# Patient Record
Sex: FEMALE | Race: WHITE | NOT HISPANIC OR LATINO
[De-identification: names, ages, dates, MRNs, and addresses within clinical notes are randomized per-mention and may not be internally consistent; named-entity substitution may affect disease eponyms.]

---

## 2018-02-20 PROBLEM — Z00.00 ENCOUNTER FOR PREVENTIVE HEALTH EXAMINATION: Status: ACTIVE | Noted: 2018-02-20

## 2018-03-22 ENCOUNTER — APPOINTMENT (OUTPATIENT)
Dept: ENDOCRINOLOGY | Facility: CLINIC | Age: 68
End: 2018-03-22
Payer: MEDICARE

## 2018-03-22 VITALS
SYSTOLIC BLOOD PRESSURE: 103 MMHG | DIASTOLIC BLOOD PRESSURE: 71 MMHG | WEIGHT: 159 LBS | HEART RATE: 94 BPM | BODY MASS INDEX: 25.55 KG/M2 | HEIGHT: 66 IN

## 2018-03-22 DIAGNOSIS — Z85.3 PERSONAL HISTORY OF MALIGNANT NEOPLASM OF BREAST: ICD-10-CM

## 2018-03-22 DIAGNOSIS — Z82.49 FAMILY HISTORY OF ISCHEMIC HEART DISEASE AND OTHER DISEASES OF THE CIRCULATORY SYSTEM: ICD-10-CM

## 2018-03-22 PROCEDURE — 83036 HEMOGLOBIN GLYCOSYLATED A1C: CPT | Mod: QW

## 2018-03-22 PROCEDURE — 82962 GLUCOSE BLOOD TEST: CPT

## 2018-03-22 PROCEDURE — 99205 OFFICE O/P NEW HI 60 MIN: CPT | Mod: 25

## 2018-03-23 LAB
T3 SERPL-MCNC: 115 NG/DL
T4 FREE SERPL-MCNC: 1.2 NG/DL
TSH SERPL-ACNC: 3.53 UIU/ML

## 2018-03-25 LAB
THYROGLOB AB SERPL-ACNC: <20 IU/ML
THYROPEROXIDASE AB SERPL IA-ACNC: <10 IU/ML

## 2018-04-12 ENCOUNTER — RX RENEWAL (OUTPATIENT)
Age: 68
End: 2018-04-12

## 2018-06-28 ENCOUNTER — APPOINTMENT (OUTPATIENT)
Dept: ENDOCRINOLOGY | Facility: CLINIC | Age: 68
End: 2018-06-28

## 2018-08-31 ENCOUNTER — APPOINTMENT (OUTPATIENT)
Dept: ENDOCRINOLOGY | Facility: CLINIC | Age: 68
End: 2018-08-31
Payer: MEDICARE

## 2018-08-31 VITALS
SYSTOLIC BLOOD PRESSURE: 140 MMHG | HEIGHT: 67 IN | WEIGHT: 165.4 LBS | BODY MASS INDEX: 25.96 KG/M2 | DIASTOLIC BLOOD PRESSURE: 89 MMHG | HEART RATE: 81 BPM

## 2018-08-31 LAB
GLUCOSE BLDC GLUCOMTR-MCNC: 149
HBA1C MFR BLD HPLC: 7.5

## 2018-08-31 PROCEDURE — 83036 HEMOGLOBIN GLYCOSYLATED A1C: CPT | Mod: QW

## 2018-08-31 PROCEDURE — 82962 GLUCOSE BLOOD TEST: CPT

## 2018-08-31 PROCEDURE — 99215 OFFICE O/P EST HI 40 MIN: CPT | Mod: 25

## 2018-09-04 ENCOUNTER — RX CHANGE (OUTPATIENT)
Age: 68
End: 2018-09-04

## 2018-09-04 RX ORDER — SITAGLIPTIN 100 MG/1
100 TABLET, FILM COATED ORAL DAILY
Qty: 90 | Refills: 3 | Status: DISCONTINUED | COMMUNITY
Start: 2018-08-31 | End: 2018-09-04

## 2018-09-05 ENCOUNTER — RX CHANGE (OUTPATIENT)
Age: 68
End: 2018-09-05

## 2018-09-05 RX ORDER — SAXAGLIPTIN 5 MG/1
5 TABLET, FILM COATED ORAL DAILY
Qty: 90 | Refills: 3 | Status: DISCONTINUED | COMMUNITY
Start: 2018-09-04 | End: 2018-09-05

## 2018-09-27 ENCOUNTER — APPOINTMENT (OUTPATIENT)
Dept: ENDOCRINOLOGY | Facility: CLINIC | Age: 68
End: 2018-09-27
Payer: MEDICARE

## 2018-09-27 ENCOUNTER — RESULT CHARGE (OUTPATIENT)
Age: 68
End: 2018-09-27

## 2018-09-27 VITALS
WEIGHT: 165 LBS | SYSTOLIC BLOOD PRESSURE: 141 MMHG | BODY MASS INDEX: 25.9 KG/M2 | HEIGHT: 67 IN | HEART RATE: 94 BPM | DIASTOLIC BLOOD PRESSURE: 85 MMHG

## 2018-09-27 LAB — GLUCOSE BLDC GLUCOMTR-MCNC: 144

## 2018-09-27 PROCEDURE — 99211 OFF/OP EST MAY X REQ PHY/QHP: CPT | Mod: 25

## 2018-09-27 PROCEDURE — 97802 MEDICAL NUTRITION INDIV IN: CPT

## 2018-09-27 PROCEDURE — 82962 GLUCOSE BLOOD TEST: CPT

## 2018-11-19 ENCOUNTER — LABORATORY RESULT (OUTPATIENT)
Age: 68
End: 2018-11-19

## 2018-11-19 ENCOUNTER — APPOINTMENT (OUTPATIENT)
Dept: ENDOCRINOLOGY | Facility: CLINIC | Age: 68
End: 2018-11-19
Payer: MEDICARE

## 2018-11-19 VITALS
DIASTOLIC BLOOD PRESSURE: 79 MMHG | HEIGHT: 67 IN | WEIGHT: 153 LBS | BODY MASS INDEX: 24.01 KG/M2 | SYSTOLIC BLOOD PRESSURE: 130 MMHG | HEART RATE: 92 BPM

## 2018-11-19 DIAGNOSIS — Z83.49 FAMILY HISTORY OF OTHER ENDOCRINE, NUTRITIONAL AND METABOLIC DISEASES: ICD-10-CM

## 2018-11-19 LAB — GLUCOSE BLDC GLUCOMTR-MCNC: 124

## 2018-11-19 PROCEDURE — 99214 OFFICE O/P EST MOD 30 MIN: CPT | Mod: 25

## 2018-11-19 PROCEDURE — 82962 GLUCOSE BLOOD TEST: CPT

## 2018-11-19 RX ORDER — BLOOD SUGAR DIAGNOSTIC
STRIP MISCELLANEOUS
Qty: 1 | Refills: 3 | Status: DISCONTINUED | COMMUNITY
Start: 2018-03-22 | End: 2018-11-19

## 2018-11-19 RX ORDER — LANCETS 33 GAUGE
EACH MISCELLANEOUS
Qty: 2 | Refills: 3 | Status: DISCONTINUED | COMMUNITY
Start: 2018-03-22 | End: 2018-11-19

## 2018-11-19 RX ORDER — ISOPROPYL ALCOHOL 0.7 ML/ML
SWAB TOPICAL
Qty: 1 | Refills: 3 | Status: DISCONTINUED | COMMUNITY
Start: 2018-03-22 | End: 2018-11-19

## 2018-11-20 ENCOUNTER — MOBILE ON CALL (OUTPATIENT)
Age: 68
End: 2018-11-20

## 2018-11-23 ENCOUNTER — MOBILE ON CALL (OUTPATIENT)
Age: 68
End: 2018-11-23

## 2018-12-05 ENCOUNTER — FORM ENCOUNTER (OUTPATIENT)
Age: 68
End: 2018-12-05

## 2018-12-06 ENCOUNTER — OUTPATIENT (OUTPATIENT)
Dept: OUTPATIENT SERVICES | Facility: HOSPITAL | Age: 68
LOS: 1 days | End: 2018-12-06
Payer: MEDICARE

## 2018-12-06 PROCEDURE — 78306 BONE IMAGING WHOLE BODY: CPT | Mod: 26

## 2018-12-06 PROCEDURE — A9503: CPT

## 2018-12-06 PROCEDURE — 78306 BONE IMAGING WHOLE BODY: CPT

## 2019-04-08 ENCOUNTER — RX RENEWAL (OUTPATIENT)
Age: 69
End: 2019-04-08

## 2019-05-02 ENCOUNTER — APPOINTMENT (OUTPATIENT)
Dept: ENDOCRINOLOGY | Facility: CLINIC | Age: 69
End: 2019-05-02

## 2019-06-14 ENCOUNTER — APPOINTMENT (OUTPATIENT)
Dept: ENDOCRINOLOGY | Facility: CLINIC | Age: 69
End: 2019-06-14
Payer: MEDICARE

## 2019-06-14 VITALS
SYSTOLIC BLOOD PRESSURE: 125 MMHG | WEIGHT: 158 LBS | HEART RATE: 79 BPM | DIASTOLIC BLOOD PRESSURE: 76 MMHG | BODY MASS INDEX: 24.8 KG/M2 | HEIGHT: 67 IN

## 2019-06-14 DIAGNOSIS — R79.89 OTHER SPECIFIED ABNORMAL FINDINGS OF BLOOD CHEMISTRY: ICD-10-CM

## 2019-06-14 LAB
GLUCOSE BLDC GLUCOMTR-MCNC: 125
HBA1C MFR BLD HPLC: 7

## 2019-06-14 PROCEDURE — 83036 HEMOGLOBIN GLYCOSYLATED A1C: CPT | Mod: QW

## 2019-06-14 PROCEDURE — 99214 OFFICE O/P EST MOD 30 MIN: CPT | Mod: 25

## 2019-06-14 PROCEDURE — 82962 GLUCOSE BLOOD TEST: CPT

## 2019-06-14 NOTE — ASSESSMENT
[FreeTextEntry1] : Type 2 diabetes mellitus. Point-of-care HbA1c 7.0% and blood glucose 125 mg/dL today; HbA1c 6.6% in January 2019 and 7.5% in August 2018. No known history of micro- or macrovascular complications. She is tolerating her current regimen well. We discussed focusing on lifestyle modification to get HbA1c to goal less than 7.0%.\par Patient to follow-up with nutrition\par Continue metformin ER 1500 mg daily\par Continue sitagliptin 100 mg daily\par She is on aspirin\par She is not on a blood pressure regimen; blood pressure under good control\par She is on statin for cholesterol; last lipid panel at goal \par Nephropathy screening: States will be performed with next laboratory testing\par Last ophthalmology appointment: June 2019\par Last dental appointment: June 2019\par She is up-to-date with influenza and pneumonia vaccines\par \par Elevated TSH. She was noted to have an elevated TSH to 7.580 uIU/mL in March 2018. Repeat thyroid function panel a few days later was normal with negative thyroid antibodies. She has subsequently been clinically and biochemically euthyroid. She has a family history of hypothyroidism.\par Monitor TSH annually or more often if symptoms develop\par \par Elevated alkaline phosphatase/bone health. Alkaline phosphatase was elevated on last blood tests with normal gamma glutamyl transferase. Nuclear medicine bone scan in December 2018 without evidence of Paget's disease or other abnormal uptake. She states a recent bone density test was normal and I advised she bring the result with her to her next appointment.\par \par Return to see me in 3 months.\par \par CC:\par Dr. Emely Mayer, Fax [phone]

## 2019-06-14 NOTE — HISTORY OF PRESENT ILLNESS
[FreeTextEntry1] : Ms. Spence is a 69 year-old woman with a history of type 2 diabetes mellitus, stage 0 breast breast status post lumpectomy on letrozole, elevated TSH more recently normal presenting for follow-up. I saw her for an initial visit in August 2018 and last in November; she is a former patient of Dr. Snyder. She is accompanied by her sister, Doris.\par \par Type 2 diabetes mellitus. Point-of-care HbA1c 7.0% and blood glucose 125 mg/dL today; HbA1c 6.6% in January 2019 and 7.5% in August 2018. No known history of micro- or macrovascular complications.\par She was diagnosed with diabetes around 2015.\par She was taking metformin ER 1500 mg daily at her initial visit with me. 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 \par She was noted to have an elevated TSH to 7.580 uIU/mL in March 2018.\par Repeat thyroid function panel a few days later was normal with negative thyroid antibodies, and she has remained biochemically euthyroid since that time.\par Her sister has a history of hypothyroidism.\par \par Interim History \par She has been having more bread and rolls. \par She had a history of elevated alkaline phosphatase. Nuclear medicine bone scan in December 2018 without evidence of Paget's disease or other abnormal uptake.\par She feels well today. Weight is 5 pounds higher than last visit. No chest pain, shortness of breath, polyuria/polydipsia, lower extremity numbness/tingling.\par Medical and surgical history, medications, allergies, social and family history reviewed and updated as needed.

## 2019-06-14 NOTE — PHYSICAL EXAM
[Alert] : alert [No Acute Distress] : no acute distress [Healthy Appearance] : healthy appearance [Normal Sclera/Conjunctiva] : normal sclera/conjunctiva [Supple] : the neck was supple [No Neck Mass] : no neck mass was observed [Normal Oropharynx] : the oropharynx was normal [No LAD] : no lymphadenopathy [Thyroid Not Enlarged] : the thyroid was not enlarged [No Thyroid Nodules] : there were no palpable thyroid nodules [Normal Rate and Effort] : normal respiratory rhythm and effort [Clear to Auscultation] : lungs were clear to auscultation bilaterally [Normal Rate] : heart rate was normal  [Normal S1, S2] : normal S1 and S2 [Regular Rhythm] : with a regular rhythm [No Edema] : there was no peripheral edema [No Stigmata of Cushings Syndrome] : no stigmata of cushings syndrome [Normal Insight/Judgement] : insight and judgment were intact [Normal Gait] : normal gait [Right Foot Was Examined] : right foot ~C was examined [Left Foot Was Examined] : left foot ~C was examined [Normal] : normal [2+] : 2+ in the dorsalis pedis [Kyphosis] : no kyphosis present [de-identified] : no moon facies, no supraclavicular fat pads [Acanthosis Nigricans] : no acanthosis nigricans [Diminished Throughout Both Feet] : normal tactile sensation with monofilament testing throughout both feet

## 2019-06-14 NOTE — DATA REVIEWED
[FreeTextEntry1] : Laboratories (January 24, 2019) reviewed and significant for:\par BUN/creatinine 18/0.69 mg/dL (eGFR 90 mL/min)\par Alkaline phosphatase 163 IU/L (normal: )\par Otherwise unremarkable comprehensive metabolic panel\par TSH 3.110 uIU/mL\par LDL 63 mg/dL\par HDL 41 mg/dL\par Total cholesterol 114 mg/dL\par Triglycerides 87 mg/dL

## 2019-07-08 ENCOUNTER — RX RENEWAL (OUTPATIENT)
Age: 69
End: 2019-07-08

## 2019-12-20 ENCOUNTER — APPOINTMENT (OUTPATIENT)
Dept: ENDOCRINOLOGY | Facility: CLINIC | Age: 69
End: 2019-12-20
Payer: MEDICARE

## 2019-12-20 VITALS
WEIGHT: 161 LBS | HEART RATE: 96 BPM | SYSTOLIC BLOOD PRESSURE: 114 MMHG | DIASTOLIC BLOOD PRESSURE: 72 MMHG | BODY MASS INDEX: 25.27 KG/M2 | HEIGHT: 67 IN

## 2019-12-20 LAB
GLUCOSE BLDC GLUCOMTR-MCNC: 115
HBA1C MFR BLD HPLC: 6.9

## 2019-12-20 PROCEDURE — 83036 HEMOGLOBIN GLYCOSYLATED A1C: CPT | Mod: QW

## 2019-12-20 PROCEDURE — 99214 OFFICE O/P EST MOD 30 MIN: CPT | Mod: 25

## 2019-12-20 PROCEDURE — 82962 GLUCOSE BLOOD TEST: CPT

## 2019-12-20 NOTE — HISTORY OF PRESENT ILLNESS
[FreeTextEntry1] : Ms. Spence is a 69 year-old woman with a history of type 2 diabetes mellitus, stage 0 breast breast status post lumpectomy on letrozole, elevated TSH more recently normal presenting for follow-up. I saw her for an initial visit in August 2018 and last in June 2019; she is a former patient of Dr. Snyder. She is accompanied by her sister, Doris.\par \par Type 2 diabetes mellitus. Point-of-care HbA1c 6.9% and blood glucose 115 mg/dL today; HbA1c 7.0% in June 2019, 6.6% in January 2019 and 7.5% in August 2018. No known history of micro- or macrovascular complications.\par She was diagnosed with diabetes around 2015.\par She was taking metformin ER 1500 mg daily at her initial visit with me. 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 \par She was noted to have an elevated TSH to 7.580 uIU/mL in March 2018.\par Repeat thyroid function panel a few days later was normal with negative thyroid antibodies, and she has remained biochemically euthyroid since that time.\par Her sister has a history of hypothyroidism.\par \par Interim History \par She has had memory issues and recently saw neurology.\par She feels well today. Weight is up 3 pounds since last visit. She made extensive lifestyle modifications since her diagnosis of diabetes and had lose around 30 pounds. No chest pain, shortness of breath, polyuria/polydipsia, lower extremity numbness/tingling.\par Medical and surgical history, medications, allergies, social and family history reviewed and updated as needed.

## 2019-12-20 NOTE — ASSESSMENT
[FreeTextEntry1] : Type 2 diabetes mellitus. Point-of-care HbA1c 6.9% and blood glucose 115 mg/dL today; HbA1c 7.0% in June 2019, 6.6% in January 2019 and 7.5% in August 2018. No known history of micro- or macrovascular complications. I congratulated her on her improvement in glycemic control. We discussed the importance of diet and exercise and lifestyle modification for glycemic control. She is tolerating her current regimen and we will continue. \par Encouraged follow-up with nutrition\par Continue metformin ER 1500 mg daily\par Continue sitagliptin 100 mg daily\par She is on aspirin\par She is not on a blood pressure regimen; blood pressure around goal\par She is on statin for cholesterol; last lipid panel around goal \par Nephropathy screening: States will be performed with next laboratory testing\par Last ophthalmology appointment: December 2019\par Last dental appointment: June 2019, upcoming appointment in January\par She is up-to-date with influenza and pneumonia vaccines\par \par Elevated TSH. She was noted to have an elevated TSH to 7.580 uIU/mL in March 2018. Repeat thyroid function panel a few days later was normal with negative thyroid antibodies. She has subsequently been clinically and biochemically euthyroid. She has a family history of hypothyroidism.\par Monitor TSH annually or more often if symptoms develop\par \par Elevated alkaline phosphatase/bone health. Alkaline phosphatase was elevated on last blood tests with normal gamma glutamyl transferase. Nuclear medicine bone scan in December 2018 without evidence of Paget's disease or other abnormal uptake. She states a recent bone density test was normal and I asked she bring the result with her to her next appointment.\par \par Return to see me in 3 months.\par \par CC:\par Dr. Emely Mayer, Fax [phone]

## 2019-12-20 NOTE — PHYSICAL EXAM
[Alert] : alert [No Acute Distress] : no acute distress [Healthy Appearance] : healthy appearance [Normal Sclera/Conjunctiva] : normal sclera/conjunctiva [Normal Oropharynx] : the oropharynx was normal [No Neck Mass] : no neck mass was observed [Supple] : the neck was supple [No LAD] : no lymphadenopathy [Thyroid Not Enlarged] : the thyroid was not enlarged [No Thyroid Nodules] : there were no palpable thyroid nodules [Normal Rate and Effort] : normal respiratory rhythm and effort [Clear to Auscultation] : lungs were clear to auscultation bilaterally [Normal Rate] : heart rate was normal  [No Edema] : there was no peripheral edema [Regular Rhythm] : with a regular rhythm [Normal S1, S2] : normal S1 and S2 [No Stigmata of Cushings Syndrome] : no stigmata of cushings syndrome [Right Foot Was Examined] : right foot ~C was examined [Normal Gait] : normal gait [Left Foot Was Examined] : left foot ~C was examined [Normal] : normal [Normal Insight/Judgement] : insight and judgment were intact [Acanthosis Nigricans] : no acanthosis nigricans [Kyphosis] : no kyphosis present [Diminished Throughout Both Feet] : normal tactile sensation with monofilament testing throughout both feet [de-identified] : no moon facies, no supraclavicular fat pads

## 2020-05-16 ENCOUNTER — RX RENEWAL (OUTPATIENT)
Age: 70
End: 2020-05-16

## 2020-06-09 ENCOUNTER — APPOINTMENT (OUTPATIENT)
Dept: ENDOCRINOLOGY | Facility: CLINIC | Age: 70
End: 2020-06-09
Payer: MEDICARE

## 2020-06-09 VITALS
HEART RATE: 93 BPM | HEIGHT: 67 IN | WEIGHT: 145 LBS | DIASTOLIC BLOOD PRESSURE: 79 MMHG | SYSTOLIC BLOOD PRESSURE: 120 MMHG | BODY MASS INDEX: 22.76 KG/M2

## 2020-06-09 LAB
GLUCOSE BLDC GLUCOMTR-MCNC: 113
HBA1C MFR BLD HPLC: 6.6

## 2020-06-09 PROCEDURE — 82962 GLUCOSE BLOOD TEST: CPT

## 2020-06-09 PROCEDURE — 99214 OFFICE O/P EST MOD 30 MIN: CPT | Mod: 25

## 2020-06-09 PROCEDURE — 83036 HEMOGLOBIN GLYCOSYLATED A1C: CPT | Mod: QW

## 2020-06-09 NOTE — ASSESSMENT
[FreeTextEntry1] : Type 2 diabetes mellitus. Point-of-care HbA1c 6.6% and blood glucose 113 mg/dL today; HbA1c 6.9% in December 2019, 7.0% in June 2019, 6.6% in January 2019 and 7.5% in August 2018. No known history of micro- or macrovascular complications. I congratulated her on her lifestyle modifications and excellent glycemic control. We discussed the importance of diet and exercise and lifestyle modification for glycemic control. She is tolerating her current regimen and we will continue. \par Continue metformin ER 1500 mg daily\par Continue sitagliptin 100 mg daily\par She is on aspirin\par She is not on a blood pressure regimen; blood pressure around goal\par She is on statin for cholesterol; last lipid panel around goal \par Nephropathy screening: States will be performed with next laboratory testing\par Last ophthalmology appointment: December 2019; upcoming appointment in June 2020\par Last dental appointment: January 2020\par She is up-to-date with influenza and pneumonia vaccines\par \par Elevated alkaline phosphatase/bone health. Alkaline phosphatase has been elevated with normal gamma glutamyl transferase. Nuclear medicine bone scan in December 2018 without evidence of Paget's disease or other abnormal uptake. She states her last bone density test was normal; we discussed regular testing due to letrozole use and concern for bone loss.\par \par Return to see me in 6 months.\par \par CC:\par Dr. Emely Mayer, Fax [phone]

## 2020-06-09 NOTE — HISTORY OF PRESENT ILLNESS
[FreeTextEntry1] : Ms. Spence is a 70 year-old woman with a history of type 2 diabetes mellitus, stage 0 breast breast status post lumpectomy on letrozole presenting for follow-up. I saw her for an initial visit in August 2018 and last in December 2019; she is a former patient of Dr. Snyder. She is accompanied by her sister, Doris.\par \par Type 2 diabetes mellitus. Point-of-care HbA1c 6.6% and blood glucose 113 mg/dL today; HbA1c 6.9% in December 2019, 7.0% in June 2019, 6.6% in January 2019 and 7.5% in August 2018. No known history of micro- or macrovascular complications.\par She was diagnosed with diabetes around 2015.\par She was taking metformin ER 1500 mg daily at her initial visit with me. 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 \par She was noted to have an elevated TSH to 7.580 uIU/mL in March 2018.\par Repeat thyroid function panel a few days later was normal with negative thyroid antibodies, and she has remained biochemically euthyroid since that time.\par Her sister has a history of hypothyroidism.\par \par Interim History \par Weight is down 16 pounds since last visit. She has been having less carbohydrates and more greens. She has been walking 4-5 miles per day. She has been following a daily list of lifestyle goals made by her primary care provider. \par She feels well today. No chest pain, shortness of breath, polyuria/polydipsia, lower extremity numbness/tingling.\par Medical and surgical history, medications, allergies, social and family history reviewed and updated as needed.

## 2020-06-09 NOTE — DATA REVIEWED
[FreeTextEntry1] : Laboratories (June 1, 2020) reviewed and significant for:\par BUN/creatinine 16/0.76 mg/dL (eGFR 80 mL/min)\par Alkaline phosphatase 173 IU/L (normal: )\par Otherwise unremarkable comprehensive metabolic panel\par Hemoglobin A1c 6.6%\par LDL 67 mg/dL\par HDL 44 mg/dL\par Total cholesterol 131 mg/dL\par Triglycerides 110 mg/dL

## 2020-06-09 NOTE — PHYSICAL EXAM
[Alert] : alert [Healthy Appearance] : healthy appearance [No Acute Distress] : no acute distress [Normal Sclera/Conjunctiva] : normal sclera/conjunctiva [No Stigmata of Cushings Syndrome] : no stigmata of Cushings Syndrome [Normal Gait] : normal gait [Normal Insight/Judgement] : insight and judgment were intact [Kyphosis] : no kyphosis present [Acanthosis Nigricans] : no acanthosis nigricans [de-identified] : no moon facies, no supraclavicular fat pads

## 2020-12-15 ENCOUNTER — APPOINTMENT (OUTPATIENT)
Dept: ENDOCRINOLOGY | Facility: CLINIC | Age: 70
End: 2020-12-15
Payer: MEDICARE

## 2020-12-15 VITALS
HEIGHT: 67 IN | WEIGHT: 145 LBS | HEART RATE: 90 BPM | BODY MASS INDEX: 22.76 KG/M2 | DIASTOLIC BLOOD PRESSURE: 80 MMHG | SYSTOLIC BLOOD PRESSURE: 146 MMHG

## 2020-12-15 LAB
GLUCOSE BLDC GLUCOMTR-MCNC: 123
HBA1C MFR BLD HPLC: 6.1

## 2020-12-15 PROCEDURE — 99072 ADDL SUPL MATRL&STAF TM PHE: CPT

## 2020-12-15 PROCEDURE — 82962 GLUCOSE BLOOD TEST: CPT

## 2020-12-15 PROCEDURE — 83036 HEMOGLOBIN GLYCOSYLATED A1C: CPT | Mod: QW

## 2020-12-15 PROCEDURE — 99214 OFFICE O/P EST MOD 30 MIN: CPT | Mod: 25

## 2020-12-15 RX ORDER — POTASSIUM BICARB/MAG COMBO 21 500-300 MG
POWDER EFFERVESCENT (GRAM) ORAL
Refills: 0 | Status: DISCONTINUED | COMMUNITY
End: 2020-12-15

## 2020-12-15 NOTE — HISTORY OF PRESENT ILLNESS
[FreeTextEntry1] : Ms. Spence is a 70 year-old woman with a history of type 2 diabetes mellitus, stage 0 breast breast status post lumpectomy on letrozole presenting for follow-up. I saw her for an initial visit in August 2018 and last in June 2020; she is a former patient of Dr. Adrian Snyder. She is accompanied by her sister, Doris.\par \par Type 2 diabetes mellitus. Point-of-care HbA1c 6.1% and blood glucose 123 mg/dL today; HbA1c 6.6% in June 2020, 6.9% in December 2019, 7.0% in June 2019, 6.6% in January 2019 and 7.5% in August 2018. No known history of micro- or macrovascular complications.\par She was diagnosed with diabetes around 2015.\par She was taking metformin ER 1500 mg daily at her initial visit with me. 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 She had an unremarkable endoscopy and colonoscopy per her report. \par She has been having less carbohydrates and more greens. She has been walking 4-6 miles per day. She has been following a daily list of lifestyle goals made by her primary care provider. \par She feels well today. Weight is stable since last visit; weight is overall down 20 pounds from a high of 165 pounds. No chest pain, shortness of breath, polyuria/polydipsia, lower extremity numbness/tingling.\par Medical and surgical history, medications, allergies, social and family history reviewed and updated as needed.

## 2020-12-15 NOTE — PHYSICAL EXAM
[Alert] : alert [Healthy Appearance] : healthy appearance [No Acute Distress] : no acute distress [Normal Sclera/Conjunctiva] : normal sclera/conjunctiva [No Stigmata of Cushings Syndrome] : no stigmata of Cushings Syndrome [Normal Gait] : normal gait [Normal Insight/Judgement] : insight and judgment were intact [Kyphosis] : no kyphosis present [Acanthosis Nigricans] : no acanthosis nigricans [de-identified] : repeat blood pressure 136/78 mmHg [de-identified] : no moon facies, no supraclavicular fat pads

## 2020-12-15 NOTE — DATA REVIEWED
[FreeTextEntry1] : Laboratories (October 28, 2020) reviewed and significant for:\par Unremarkable complete blood count\par BUN/creatinine 15/0.74 mg/dL (eGFR 83 mL/min)\par Alkaline phosphatase 183 IU/L (normal: )\par Otherwise unremarkable comprehensive metabolic panel\par Hemoglobin A1c 6.8%\par Ferritin 9 ng/mL (normal: )\par \par Laboratories (June 1, 2020) reviewed and significant for:\par BUN/creatinine 16/0.76 mg/dL (eGFR 80 mL/min)\par Alkaline phosphatase 173 IU/L (normal: )\par Otherwise unremarkable comprehensive metabolic panel\par Hemoglobin A1c 6.6%\par LDL 67 mg/dL\par HDL 44 mg/dL\par Total cholesterol 131 mg/dL\par Triglycerides 110 mg/dL

## 2020-12-15 NOTE — ASSESSMENT
[FreeTextEntry1] : Type 2 diabetes mellitus. Point-of-care HbA1c 6.1% and blood glucose 123 mg/dL today; HbA1c 6.6% in June 2020, 6.9% in December 2019, 7.0% in June 2019, 6.6% in January 2019 and 7.5% in August 2018. No known history of micro- or macrovascular complications. I congratulated her on her lifestyle modifications and excellent glycemic control. We discussed the importance of diet and exercise and lifestyle modification for glycemic control. She is tolerating her current regimen and we will continue. \par Continue metformin ER 1500 mg daily\par Continue sitagliptin 100 mg daily\par She is on aspirin\par She is not on a blood pressure regimen; blood pressure around goal\par She is on statin for cholesterol; last lipid panel around goal \par Nephropathy screening: Due; recommend with next laboratory testing\par Last ophthalmology appointment: June 2020\par Last podiatry appointment: December 2020\par Last dental appointment: December 2020\par She is up-to-date with influenza and pneumonia vaccines\par \par Elevated alkaline phosphatase/bone health. Alkaline phosphatase had been elevated with normal gamma glutamyl transferase. Nuclear medicine bone scan in December 2018 without evidence of Paget's disease or other abnormal uptake. She states her last bone density test was normal; we discussed regular testing due to letrozole use and concern for bone loss.\par \par Return to see me in 6 months.\par \par CC:\par Dr. Emely Mayer, Fax [phone]

## 2021-03-22 ENCOUNTER — OUTPATIENT (OUTPATIENT)
Dept: OUTPATIENT SERVICES | Facility: HOSPITAL | Age: 71
LOS: 1 days | End: 2021-03-22

## 2021-03-22 ENCOUNTER — APPOINTMENT (OUTPATIENT)
Dept: CT IMAGING | Facility: CLINIC | Age: 71
End: 2021-03-22
Payer: SELF-PAY

## 2021-03-22 PROCEDURE — 75571 CT HRT W/O DYE W/CA TEST: CPT | Mod: 26

## 2021-06-23 ENCOUNTER — APPOINTMENT (OUTPATIENT)
Dept: ENDOCRINOLOGY | Facility: CLINIC | Age: 71
End: 2021-06-23
Payer: MEDICARE

## 2021-06-23 VITALS
SYSTOLIC BLOOD PRESSURE: 126 MMHG | BODY MASS INDEX: 23.34 KG/M2 | HEART RATE: 84 BPM | DIASTOLIC BLOOD PRESSURE: 80 MMHG | WEIGHT: 149 LBS

## 2021-06-23 LAB
GLUCOSE BLDC GLUCOMTR-MCNC: 112
HBA1C MFR BLD HPLC: 6.6

## 2021-06-23 PROCEDURE — 82962 GLUCOSE BLOOD TEST: CPT

## 2021-06-23 PROCEDURE — 99072 ADDL SUPL MATRL&STAF TM PHE: CPT

## 2021-06-23 PROCEDURE — 83036 HEMOGLOBIN GLYCOSYLATED A1C: CPT | Mod: QW

## 2021-06-23 PROCEDURE — 99214 OFFICE O/P EST MOD 30 MIN: CPT | Mod: 25

## 2021-06-23 NOTE — HISTORY OF PRESENT ILLNESS
[FreeTextEntry1] : Ms. Spence is a 71 year-old woman with a history of type 2 diabetes mellitus, breast cancer in situ status post lumpectomy on letrozole presenting for follow-up. I saw her for an initial visit in August 2018 and last in December 2020; she is a former patient of Dr. Adrian Snyder. She is accompanied by her sister, Doris.\par \par Type 2 diabetes mellitus. Point-of-care HbA1c 6.6% and glucose 112 mg/dL today; HbA1c 6.1% in December 2020, 6.6% in June 2020, 6.9% in December 2019, 7.0% in June 2019, 6.6% in January 2019 and 7.5% in August 2018. No known history of micro- or macrovascular complications.\par She was diagnosed with diabetes around 2015.\par At her initial visit she was taking metformin ER 1500 mg daily. 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 \par Her dose of vitamin B12 supplementation was decreased.\par She has been having less carbohydrates and more greens. She has been walking 4-5 miles per day. She has been following a daily list of lifestyle goals made by her primary care provider. \par She feels well today. Weight is up 4 pounds since last visit; weight was overall down 20 pounds from a high of 165 pounds. No chest pain, shortness of breath, polyuria/polydipsia, lower extremity numbness/tingling.\par Medical and surgical history, medications, allergies, social and family history reviewed and updated as needed.

## 2021-06-23 NOTE — DATA REVIEWED
[FreeTextEntry1] : Laboratories (June 14, 2021) reviewed and significant for:\par Unremarkable complete blood count\par Alkaline phosphatase 162 IU/L (normal: )\par Otherwise unremarkable comprehensive metabolic panel\par HbA1c 6.6%\par LDL 71 mg/dL\par HDL 55 mg/dL\par Total cholesterol 145 mg/dL\par Triglycerides 97 mg/dL

## 2021-06-23 NOTE — ASSESSMENT
[FreeTextEntry1] : Type 2 diabetes mellitus. Point-of-care HbA1c 6.6% and glucose 112 mg/dL today. No known history of micro- or macrovascular complications. I congratulated her on her lifestyle modifications and excellent glycemic control. We discussed the importance of diet and exercise and lifestyle modification for glycemic control. She is tolerating her current regimen and we will continue. \par Continue metformin ER 1500 mg daily\par Continue sitagliptin 100 mg daily\par She is on aspirin\par She is not on a blood pressure regimen; blood pressure around goal\par She is on statin for cholesterol; last lipid panel around goal \par Nephropathy screening: Due; recommend with next laboratory testing\par Last ophthalmology appointment: April 2021; every six months\par Last podiatry appointment: Every 8-9 weeks; upcoming appointment\par Last dental appointment: Every 4 months; upcoming appointment\par She is up-to-date with influenza, pneumonia, and COVID-19 (Moderna) vaccines\par \par Elevated alkaline phosphatase/bone health. Alkaline phosphatase had been elevated with normal gamma glutamyl transferase. Nuclear medicine bone scan in December 2018 without evidence of Paget's disease or other abnormal uptake. She states her last bone density test was normal; we discussed regular testing due to letrozole use and concern for bone loss. The plan is to continue letrozole through May 2022.\par \par Return to see me in 6 months.\par \par CC:\par Dr. Emely Mayer, Fax [phone]

## 2021-06-23 NOTE — PHYSICAL EXAM
[Alert] : alert [Healthy Appearance] : healthy appearance [No Acute Distress] : no acute distress [Normal Sclera/Conjunctiva] : normal sclera/conjunctiva [No Stigmata of Cushings Syndrome] : no stigmata of Cushings Syndrome [Normal Gait] : normal gait [Normal Insight/Judgement] : insight and judgment were intact [Normal Hearing] : hearing was normal [No Respiratory Distress] : no respiratory distress [Kyphosis] : no kyphosis present [Acanthosis Nigricans] : no acanthosis nigricans [de-identified] : no moon facies, no supraclavicular fat pads

## 2021-12-20 ENCOUNTER — APPOINTMENT (OUTPATIENT)
Dept: ENDOCRINOLOGY | Facility: CLINIC | Age: 71
End: 2021-12-20
Payer: MEDICARE

## 2021-12-20 VITALS
WEIGHT: 155 LBS | BODY MASS INDEX: 24.33 KG/M2 | SYSTOLIC BLOOD PRESSURE: 129 MMHG | HEART RATE: 82 BPM | HEIGHT: 67 IN | DIASTOLIC BLOOD PRESSURE: 83 MMHG

## 2021-12-20 LAB
GLUCOSE BLDC GLUCOMTR-MCNC: 89
HBA1C MFR BLD HPLC: 6.6

## 2021-12-20 PROCEDURE — 82962 GLUCOSE BLOOD TEST: CPT

## 2021-12-20 PROCEDURE — 83036 HEMOGLOBIN GLYCOSYLATED A1C: CPT | Mod: QW

## 2021-12-20 PROCEDURE — 99214 OFFICE O/P EST MOD 30 MIN: CPT | Mod: 25

## 2021-12-20 RX ORDER — KETOROLAC TROMETHAMINE 5 MG/ML
0.5 SOLUTION OPHTHALMIC
Qty: 3 | Refills: 0 | Status: ACTIVE | COMMUNITY
Start: 2021-09-08

## 2022-04-14 ENCOUNTER — APPOINTMENT (OUTPATIENT)
Dept: ENDOCRINOLOGY | Facility: CLINIC | Age: 72
End: 2022-04-14
Payer: MEDICARE

## 2022-04-14 VITALS
BODY MASS INDEX: 24.01 KG/M2 | HEIGHT: 67 IN | SYSTOLIC BLOOD PRESSURE: 130 MMHG | DIASTOLIC BLOOD PRESSURE: 84 MMHG | WEIGHT: 153 LBS | HEART RATE: 84 BPM

## 2022-04-14 LAB
GLUCOSE BLDC GLUCOMTR-MCNC: 120
HBA1C MFR BLD HPLC: 7.2

## 2022-04-14 PROCEDURE — 83036 HEMOGLOBIN GLYCOSYLATED A1C: CPT | Mod: QW

## 2022-04-14 PROCEDURE — 82962 GLUCOSE BLOOD TEST: CPT

## 2022-04-14 PROCEDURE — 99214 OFFICE O/P EST MOD 30 MIN: CPT | Mod: 25

## 2022-04-14 NOTE — PHYSICAL EXAM
[Alert] : alert [Healthy Appearance] : healthy appearance [No Acute Distress] : no acute distress [Normal Sclera/Conjunctiva] : normal sclera/conjunctiva [Normal Hearing] : hearing was normal [No Respiratory Distress] : no respiratory distress [No Stigmata of Cushings Syndrome] : no stigmata of Cushings Syndrome [Normal Gait] : normal gait [Normal Insight/Judgement] : insight and judgment were intact [Kyphosis] : no kyphosis present [Acanthosis Nigricans] : no acanthosis nigricans [de-identified] : no moon facies, no supraclavicular fat pads

## 2022-04-14 NOTE — HISTORY OF PRESENT ILLNESS
[FreeTextEntry1] : Ms. Spence is a 72 year-old woman with a history of type 2 diabetes mellitus, breast cancer in situ status post lumpectomy on letrozole presenting for follow-up. I saw her for an initial visit in August 2018 and last in December 2021; she is a former patient of Dr. Ardian Snyder. She is accompanied by her sister, Doris.\par \par Type 2 diabetes mellitus. Point-of-care HbA1c 7.2% and glucose 120 mg/dL today; HbA1c 6.6% in December 2021, 6.6% in June 2021, 6.1% in December 2020, 6.6% in June 2020, 6.9% in December 2019, 7.0% in June 2019, 6.6% in January 2019 and 7.5% in August 2018. No known history of micro- or macrovascular complications.\par She was diagnosed with diabetes around 2015.\par At her initial visit she was taking metformin ER 1500 mg daily. We continued metformin ER 1500 mg daily and started Januvia 100 mg daily in September 2018. 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 Her sister and her wife will be moving to [place] also.\par She has been having more carbohydrates recently. She has started eating dinner earlier. She has been walking 4-5 miles per day. She has been following a daily list of lifestyle goals made by her primary care provider. \par She feels well today. Weight is down 2 pounds since last visit; weight is overall down 12 pounds from 165 pounds. No chest pain, shortness of breath, polyuria/polydipsia, lower extremity numbness/tingling.\par Medical and surgical history, medications, allergies, social and family history reviewed and updated as needed.

## 2022-04-14 NOTE — PHYSICAL EXAM
[Alert] : alert [Healthy Appearance] : healthy appearance [No Acute Distress] : no acute distress [Normal Sclera/Conjunctiva] : normal sclera/conjunctiva [Normal Hearing] : hearing was normal [No Respiratory Distress] : no respiratory distress [No Stigmata of Cushings Syndrome] : no stigmata of Cushings Syndrome [Normal Gait] : normal gait [Normal Insight/Judgement] : insight and judgment were intact [Kyphosis] : no kyphosis present [Acanthosis Nigricans] : no acanthosis nigricans [de-identified] : no moon facies, no supraclavicular fat pads

## 2022-04-14 NOTE — ASSESSMENT
[FreeTextEntry1] : Type 2 diabetes mellitus. Point-of-care HbA1c 7.2% and glucose 120 mg/dL today. No known history of micro- or macrovascular complications. We discussed the importance of diet and exercise and lifestyle modification for glycemic control. We discussed pharmacologic options for glycemic control. She is tolerating her current regimen and we will continue for now, with a focus on lifestyle modification. We can consider changing sitagliptin to an SGLT-2 inhibitor or GLP-1 receptor agonist as needed next visit. \par Continue metformin ER 1500 mg daily\par Continue sitagliptin 100 mg daily\par She is not on a blood pressure regimen; blood pressure around goal\par She is on statin for cholesterol; last lipid panel around goal \par Nephropathy screening: Urine microalbumin within range in October 2021\par Last ophthalmology appointment: January 2022; regular appointments\par Last podiatry appointment: Every 8-9 weeks; upcoming appointment\par Last dental appointment: Every four months; upcoming appointment\par She is up-to-date with influenza, pneumonia, and COVID-19 (Moderna) vaccines\par \par Elevated alkaline phosphatase/bone health. Alkaline phosphatase had been elevated with normal gamma glutamyl transferase. Nuclear medicine bone scan in December 2018 without evidence of Paget's disease or other abnormal uptake. She states her last bone density test was normal; we discussed regular testing due to letrozole use and concern for bone loss. The plan is to continue letrozole through May 2022.\par \par Return to see me in 3 months.\par \par CC:\par Dr. Emely Mayer, Fax [phone]

## 2022-04-14 NOTE — ASSESSMENT
[FreeTextEntry1] : Type 2 diabetes mellitus. Point-of-care HbA1c 7.2% and glucose 120 mg/dL today. No known history of micro- or macrovascular complications. We discussed the importance of diet and exercise and lifestyle modification for glycemic control. We discussed pharmacologic options for glycemic control. She is tolerating her current regimen and we will continue for now, with a focus on lifestyle modification. We can consider changing sitagliptin to an SGLT-2 inhibitor or GLP-1 receptor agonist as needed next visit. \par Continue metformin ER 1500 mg daily\par Continue sitagliptin 100 mg daily\par She is not on a blood pressure regimen; blood pressure around goal\par She is on statin for cholesterol; last lipid panel around goal \par Nephropathy screening: Urine microalbumin within range in October 2021\par Last ophthalmology appointment: January 2022; regular appointments\par Last podiatry appointment: Every 8-9 weeks; upcoming appointment\par Last dental appointment: Every four months; upcoming appointment\par She is up-to-date with influenza, pneumonia, and COVID-19 (Moderna) vaccines\par \par Elevated alkaline phosphatase/bone health. Alkaline phosphatase had been elevated with normal gamma glutamyl transferase. Nuclear medicine bone scan in December 2018 without evidence of Paget's disease or other abnormal uptake. She states her last bone density test was normal; we discussed regular testing due to letrozole use and concern for bone loss. The plan is to continue letrozole through May 2022.\par \par Return to see me in 3 months.\par \par CC:\par Dr. Emely Mayer, Fax [phone]

## 2022-04-14 NOTE — DATA REVIEWED
[FreeTextEntry1] : Laboratories (February 4, 2022) reviewed and significant for:\par HbA1c 7.1%\par LDL 73 mg/dL\par HDL 40 mg/dL\par Total cholesterol 140 mg/dL\par Triglycerides 99 mg/dL\par \par Laboratories (June 14, 2021) reviewed and significant for:\par Unremarkable complete blood count\par Alkaline phosphatase 162 IU/L (normal: )\par Otherwise unremarkable comprehensive metabolic panel\par HbA1c 6.6%\par LDL 71 mg/dL\par HDL 55 mg/dL\par Total cholesterol 145 mg/dL\par Triglycerides 97 mg/dL

## 2022-04-14 NOTE — HISTORY OF PRESENT ILLNESS
[FreeTextEntry1] : Ms. Spence is a 72 year-old woman with a history of type 2 diabetes mellitus, breast cancer in situ status post lumpectomy on letrozole presenting for follow-up. I saw her for an initial visit in August 2018 and last in December 2021; she is a former patient of Dr. Adrian Snyder. She is accompanied by her sister, Doris.\par \par Type 2 diabetes mellitus. Point-of-care HbA1c 7.2% and glucose 120 mg/dL today; HbA1c 6.6% in December 2021, 6.6% in June 2021, 6.1% in December 2020, 6.6% in June 2020, 6.9% in December 2019, 7.0% in June 2019, 6.6% in January 2019 and 7.5% in August 2018. No known history of micro- or macrovascular complications.\par She was diagnosed with diabetes around 2015.\par At her initial visit she was taking metformin ER 1500 mg daily. We continued metformin ER 1500 mg daily and started Januvia 100 mg daily in September 2018. 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 Her sister and her wife will be moving to [place] also.\par She has been having more carbohydrates recently. She has started eating dinner earlier. She has been walking 4-5 miles per day. She has been following a daily list of lifestyle goals made by her primary care provider. \par She feels well today. Weight is down 2 pounds since last visit; weight is overall down 12 pounds from 165 pounds. No chest pain, shortness of breath, polyuria/polydipsia, lower extremity numbness/tingling.\par Medical and surgical history, medications, allergies, social and family history reviewed and updated as needed.

## 2022-06-06 ENCOUNTER — RX RENEWAL (OUTPATIENT)
Age: 72
End: 2022-06-06

## 2022-07-28 ENCOUNTER — APPOINTMENT (OUTPATIENT)
Dept: ENDOCRINOLOGY | Facility: CLINIC | Age: 72
End: 2022-07-28

## 2022-07-28 ENCOUNTER — APPOINTMENT (OUTPATIENT)
Dept: NEUROLOGY | Facility: AMBULATORY SURGERY CENTER | Age: 72
End: 2022-07-28

## 2022-07-28 VITALS
HEIGHT: 67 IN | DIASTOLIC BLOOD PRESSURE: 76 MMHG | SYSTOLIC BLOOD PRESSURE: 132 MMHG | TEMPERATURE: 97.8 F | BODY MASS INDEX: 23.86 KG/M2 | OXYGEN SATURATION: 99 % | HEART RATE: 78 BPM | WEIGHT: 152 LBS

## 2022-07-28 VITALS
DIASTOLIC BLOOD PRESSURE: 74 MMHG | BODY MASS INDEX: 24.28 KG/M2 | SYSTOLIC BLOOD PRESSURE: 118 MMHG | HEART RATE: 84 BPM | WEIGHT: 155 LBS

## 2022-07-28 DIAGNOSIS — Z79.811 LONG TERM (CURRENT) USE OF AROMATASE INHIBITORS: ICD-10-CM

## 2022-07-28 LAB
GLUCOSE BLDC GLUCOMTR-MCNC: 193
HBA1C MFR BLD HPLC: 6.6

## 2022-07-28 PROCEDURE — 83036 HEMOGLOBIN GLYCOSYLATED A1C: CPT | Mod: QW

## 2022-07-28 PROCEDURE — 99215 OFFICE O/P EST HI 40 MIN: CPT

## 2022-07-28 PROCEDURE — 99214 OFFICE O/P EST MOD 30 MIN: CPT | Mod: 25

## 2022-07-28 PROCEDURE — 82962 GLUCOSE BLOOD TEST: CPT

## 2022-07-28 RX ORDER — ATORVASTATIN CALCIUM 40 MG/1
40 TABLET, FILM COATED ORAL DAILY
Refills: 0 | Status: ACTIVE | COMMUNITY

## 2022-07-28 RX ORDER — BENZOCAINE/BENZALKONIUM/ALOE/E 5 %-0.13 %
1500 CREAM (GRAM) TOPICAL DAILY
Refills: 0 | Status: ACTIVE | COMMUNITY

## 2022-07-28 RX ORDER — CHOLECALCIFEROL (VITAMIN D3) 50 MCG
50 MCG TABLET ORAL
Refills: 0 | Status: ACTIVE | COMMUNITY

## 2022-07-28 RX ORDER — LETROZOLE TABLETS 2.5 MG/1
2.5 TABLET, FILM COATED ORAL
Refills: 0 | Status: DISCONTINUED | COMMUNITY
End: 2022-07-28

## 2022-07-28 RX ORDER — OMEPRAZOLE 10 MG/1
10 CAPSULE, DELAYED RELEASE ORAL
Refills: 0 | Status: ACTIVE | COMMUNITY

## 2022-07-28 RX ORDER — ASPIRIN 81 MG
81 TABLET, DELAYED RELEASE (ENTERIC COATED) ORAL DAILY
Refills: 0 | Status: ACTIVE | COMMUNITY

## 2022-07-28 NOTE — PHYSICAL EXAM
[Alert] : alert [Healthy Appearance] : healthy appearance [No Acute Distress] : no acute distress [Normal Sclera/Conjunctiva] : normal sclera/conjunctiva [Normal Hearing] : hearing was normal [No Respiratory Distress] : no respiratory distress [No Stigmata of Cushings Syndrome] : no stigmata of Cushings Syndrome [Normal Gait] : normal gait [Normal Insight/Judgement] : insight and judgment were intact [Kyphosis] : no kyphosis present [Acanthosis Nigricans] : no acanthosis nigricans [de-identified] : no moon facies, no supraclavicular fat pads

## 2022-07-28 NOTE — ASSESSMENT
[FreeTextEntry1] : Type 2 diabetes mellitus. Point-of-care HbA1c 6.6% and glucose 193 mg/dL today (recent lunch). No known history of micro- or macrovascular complications. I congratulated her on her excellent glycemic control. We discussed the importance of diet and exercise and lifestyle modification for glycemic control. We discussed pharmacologic options for glycemic control. She is tolerating her current regimen and we will continue for now.\par Continue metformin ER 1500 mg daily\par Continue sitagliptin 100 mg daily\par She is not on a blood pressure regimen; blood pressure around goal\par She is on statin for cholesterol; last lipid panel around goal \par Nephropathy screening: Urine microalbumin within range in October 2021\par Last ophthalmology appointment: Every four months\par Last podiatry appointment: Every 8-9 weeks\par Last dental appointment: Every four months\par She is up-to-date with influenza, pneumonia, and COVID-19 (Moderna) vaccines\par \par Elevated alkaline phosphatase/bone health. Alkaline phosphatase had been elevated with normal gamma glutamyl transferase. Nuclear medicine bone scan in December 2018 without evidence of Paget's disease or other abnormal uptake. She states her last bone density test was normal.\par \par Return to see me in 4 months.\par \par CC:\par Dr. Emely Mayer, Fax [phone]

## 2022-07-28 NOTE — HISTORY OF PRESENT ILLNESS
[FreeTextEntry1] : CATRACHITO BARLOW is a 72 year who presents with memory loss attributed to b12 deficiency\par \par patient transferring from my [facility] practice\par \par last visit was 1/26/2022.  was finishing cog therapy increased walking speed, better balance\par \par living in an independent living community and has adjusted really well.  has a good community.  exercising regularly. has a good routine.  started using pill box finally.  taking care of her own needs better.\par \par taking oral b12 \par \par language issues might continue to worsen.  might substitute words.  seems to give up when words on tip of her tongue\par \par

## 2022-07-28 NOTE — DATA REVIEWED
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

## 2022-07-28 NOTE — PHYSICAL EXAM
[FreeTextEntry1] : General: this is a pleasant patient in no acute distress\par \par HEENT conjunctiva are normal, oropharynx is clear, no tenderness in head\par \par CV: normal pulses, regular rate and rhythm, no peripheral edema noted\par \par Lungs: breathing is non-labored\par \par abd: soft and non-distended\par \par MSK:\par SLR: \par BAO:\par range of motion:\par tinnels: \par spurling:\par Occipital nerve tenderness:\par \par Mental status:\par Alert and oriented to person, place and time\par Memory: registers 3/3, recalls 0/3\par Attention: serial 7s [id number]\par Language is normal with intermittent hesitancy\par repeats complex sentence\par follows embedded commands\par clock draw good until I asked to put time 10 after 11 which she marked as 10:55\par \par Cranial Nerves:\par extra-occular movements in tact without nystagmus, normal saccades and smooth pursuit, visual fields full to confrontation, pupils equal, round and reactive to light. Face symmetric and facial strength symmetric, facial sensation symmetric, hearing present bilaterally to finger rub, neck flexion and extension normal strength.\par \par Motor: normal bulk and tone throughout. no abnormal movements.  Full 5/5 strength uppers and lower extremities proximally and distally\par \par Sensory: in tact and symmetric to vibration, light tough, pin prick, temperature decreased vib on R ankle\par \par Cerebellar: normal finger-nose-finger bilaterally\par \par Reflexes: 2+ in the upper and lower extremities and symmetric.  toes are bilaterally downgoing.\par \par Gait: stable, able to tip toe heel and tandem\par \par Stances:\par Romberg: stable\par \par

## 2022-07-28 NOTE — HISTORY OF PRESENT ILLNESS
[FreeTextEntry1] : Ms. Spence is a 72 year-old woman with a history of type 2 diabetes mellitus, breast cancer in situ status post lumpectomy on letrozole presenting for follow-up. I saw her for an initial visit in August 2018 and last in April 2022; she is a former patient of Dr. Adrian Snyder. She is accompanied by her sister, Doris.\par \par Type 2 diabetes mellitus. Point-of-care HbA1c 6.6% and glucose 193 mg/dL today (recent lunch); HbA1c 7.2% in April 2022, 6.6% in December 2021, 6.6% in June 2021, 6.1% in December 2020, 6.6% in June 2020, 6.9% in December 2019, 7.0% in June 2019, 6.6% in January 2019 and 7.5% in August 2018. No known history of micro- or macrovascular complications.\par She was diagnosed with diabetes around 2015.\par At her initial visit she was taking metformin ER 1500 mg daily. We continued metformin ER 1500 mg daily and started Januvia 100 mg daily in September 2018. 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 Her sister and her wife will be moving to [place] also.\par She has cut down on carbohydrates. Her assisted living facility has a bakery and homemade ice cream, but she has been able to not indulge. She has started eating dinner earlier. She has been walking more. She has been following a daily list of lifestyle goals made by her primary care provider. \par She has stopped taking letrozole since she is five years from her diagnosis of breast cancer.\par She feels well today. Weight is overall down 10 pounds from 165 pounds. No chest pain, shortness of breath, polyuria/polydipsia, lower extremity numbness/tingling.\par Medical and surgical history, medications, allergies, social and family history reviewed and updated as needed.

## 2022-07-28 NOTE — CONSULT LETTER
[Dear  ___] : Dear  [unfilled], [Courtesy Letter:] : I had the pleasure of seeing your patient, [unfilled], in my office today. [Please see my note below.] : Please see my note below. [Consult Closing:] : Thank you very much for allowing me to participate in the care of this patient.  If you have any questions, please do not hesitate to contact me. [Sincerely,] : Sincerely, [DrMelissa  ___] : Dr. HOWELL [FreeTextEntry3] : Julian Harden MD

## 2022-12-06 ENCOUNTER — APPOINTMENT (OUTPATIENT)
Dept: ENDOCRINOLOGY | Facility: CLINIC | Age: 72
End: 2022-12-06

## 2022-12-06 VITALS
HEART RATE: 86 BPM | HEIGHT: 67 IN | DIASTOLIC BLOOD PRESSURE: 70 MMHG | WEIGHT: 150 LBS | SYSTOLIC BLOOD PRESSURE: 128 MMHG | BODY MASS INDEX: 23.54 KG/M2

## 2022-12-06 LAB
GLUCOSE BLDC GLUCOMTR-MCNC: 121
HBA1C MFR BLD HPLC: 6.9

## 2022-12-06 PROCEDURE — 82962 GLUCOSE BLOOD TEST: CPT

## 2022-12-06 PROCEDURE — 83036 HEMOGLOBIN GLYCOSYLATED A1C: CPT | Mod: QW

## 2022-12-06 PROCEDURE — 99214 OFFICE O/P EST MOD 30 MIN: CPT | Mod: 25

## 2022-12-07 LAB
CREAT SPEC-SCNC: 130 MG/DL
MICROALBUMIN 24H UR DL<=1MG/L-MCNC: <1.2 MG/DL
MICROALBUMIN/CREAT 24H UR-RTO: NORMAL MG/G

## 2022-12-07 NOTE — PHYSICAL EXAM
[Alert] : alert [Healthy Appearance] : healthy appearance [No Acute Distress] : no acute distress [Normal Sclera/Conjunctiva] : normal sclera/conjunctiva [Normal Hearing] : hearing was normal [No Respiratory Distress] : no respiratory distress [No Stigmata of Cushings Syndrome] : no stigmata of Cushings Syndrome [Normal Gait] : normal gait [Normal Insight/Judgement] : insight and judgment were intact [Kyphosis] : no kyphosis present [Acanthosis Nigricans] : no acanthosis nigricans [de-identified] : no moon facies, no supraclavicular fat pads

## 2022-12-07 NOTE — DATA REVIEWED
[FreeTextEntry1] : Laboratories (August 24, 2022) reviewed and significant for:\par Unremarkable complete blood count\par Alkaline phosphatase 158 IU/L (normal: )\par Otherwise unremarkable comprehensive metabolic panel\par HbA1c 6.6%\par LDL 62 mg/dL\par HDL 45 mg/dL\par Total cholesterol 135 mg/dL\par Triglycerides 116 mg/dL\par Vitamin B12 1289 pg/mL

## 2022-12-07 NOTE — ASSESSMENT
[FreeTextEntry1] : Type 2 diabetes mellitus. Point-of-care HbA1c 6.9% and glucose 121 mg/dL today. No known history of micro- or macrovascular complications. I congratulated her on her excellent glycemic control. We discussed the importance of diet and exercise and lifestyle modification for glycemic control. We discussed pharmacologic options for glycemic control. She is tolerating her current regimen and we will continue.\par Continue metformin ER 1500 mg daily\par Continue sitagliptin 100 mg daily\par She is not on a blood pressure regimen; blood pressure around goal\par She is on statin for cholesterol; last lipid panel around goal \par Nephropathy screening: Due\par Last ophthalmology appointment: Every six months\par Last podiatry appointment: Every six weeks\par Last dental appointment: Every six months\par She is up-to-date with influenza, pneumonia, and COVID-19 (Moderna) vaccines up to the bivalent booster\par \par Elevated alkaline phosphatase/bone health. Alkaline phosphatase had been elevated with normal gamma glutamyl transferase. Nuclear medicine bone scan in December 2018 without evidence of Paget's disease or other abnormal uptake. She and her sister may have a genetic etiology. She states her last bone density test was normal.\par \par Return to see me in 4 months.\par \par CC:\par Dr. Emely Mayer, Fax [phone]

## 2022-12-07 NOTE — HISTORY OF PRESENT ILLNESS
[FreeTextEntry1] : Ms. Spence is a 72 year-old woman with a history of type 2 diabetes mellitus, breast cancer in situ status post lumpectomy on letrozole presenting for follow-up. I saw her for an initial visit in August 2018 and last in July 2022; she is a former patient of Dr. Adrian Snyder. She is accompanied by her sister, Doris.\par \par Type 2 diabetes mellitus. Point-of-care HbA1c 6.9% and glucose 121 mg/dL today; HbA1c 6.6% in July 2022, 7.2% in April 2022, 6.6% in December 2021, 6.6% in June 2021, 6.1% in December 2020, 6.6% in June 2020, 6.9% in December 2019, 7.0% in June 2019, 6.6% in January 2019 and 7.5% in August 2018. No known history of micro- or macrovascular complications.\par She was diagnosed with diabetes around 2015.\par At her initial visit she was taking metformin ER 1500 mg daily. We continued metformin ER 1500 mg daily and started Januvia 100 mg daily in September 2018. 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\par She is not on a blood pressure regimen\par She is on a statin for cholesterol\par Nephropathy screening: Urine microalbumin within range in October 2021\par Last ophthalmology appointment: Every six months\par Last podiatry appointment: Every six weeks\par Last dental appointment: Every six months\par She is up-to-date with influenza, pneumonia, and COVID-19 (Moderna) vaccines up to the bivalent booster\par \par Interim History \par She is currently taking metformin ER 1500 mg daily and Januvia 100 mg daily.\par Recent laboratory results ordered by her primary care provider significant for the below. HbA1c 6.6%. Lipid panel around goal. \par She has moved to an assisted living facility in [place]. Her sister and her wife will be moving to [place] also.\par She has cut down on carbohydrates. Her assisted living facility has a bakery and homemade ice cream, but she has been able to not indulge. She has started eating dinner earlier. She walks 4-5 miles a day. She goes to the gym five days a week. She has been following a daily list of lifestyle goals made by her primary care provider. \par She feels well today. Weight is down 5 pounds since last visit and overall down 15 pounds from 165 pounds. No chest pain, shortness of breath, polyuria/polydipsia, lower extremity numbness/tingling.\par Medical and surgical history, medications, allergies, social and family history reviewed and updated as needed.

## 2023-02-27 ENCOUNTER — APPOINTMENT (OUTPATIENT)
Dept: CT IMAGING | Facility: HOSPITAL | Age: 73
End: 2023-02-27

## 2023-04-18 ENCOUNTER — APPOINTMENT (OUTPATIENT)
Dept: ENDOCRINOLOGY | Facility: CLINIC | Age: 73
End: 2023-04-18
Payer: MEDICARE

## 2023-04-18 VITALS
WEIGHT: 149 LBS | HEIGHT: 67 IN | DIASTOLIC BLOOD PRESSURE: 76 MMHG | SYSTOLIC BLOOD PRESSURE: 130 MMHG | HEART RATE: 73 BPM | BODY MASS INDEX: 23.39 KG/M2

## 2023-04-18 LAB — GLUCOSE BLDC GLUCOMTR-MCNC: 91

## 2023-04-18 PROCEDURE — 99214 OFFICE O/P EST MOD 30 MIN: CPT | Mod: 25

## 2023-04-18 PROCEDURE — 82962 GLUCOSE BLOOD TEST: CPT

## 2023-04-19 LAB
25(OH)D3 SERPL-MCNC: 38.1 NG/ML
ALBUMIN SERPL ELPH-MCNC: 4.5 G/DL
ALP BLD-CCNC: 143 U/L
ALT SERPL-CCNC: 16 U/L
ANION GAP SERPL CALC-SCNC: 13 MMOL/L
AST SERPL-CCNC: 16 U/L
BASOPHILS # BLD AUTO: 0.05 K/UL
BASOPHILS NFR BLD AUTO: 0.6 %
BILIRUB SERPL-MCNC: 0.5 MG/DL
BUN SERPL-MCNC: 19 MG/DL
CALCIUM SERPL-MCNC: 10.9 MG/DL
CHLORIDE SERPL-SCNC: 103 MMOL/L
CHOLEST SERPL-MCNC: 134 MG/DL
CO2 SERPL-SCNC: 26 MMOL/L
CREAT SERPL-MCNC: 0.69 MG/DL
EGFR: 92 ML/MIN/1.73M2
EOSINOPHIL # BLD AUTO: 0.15 K/UL
EOSINOPHIL NFR BLD AUTO: 1.8 %
ESTIMATED AVERAGE GLUCOSE: 148 MG/DL
GLUCOSE SERPL-MCNC: 93 MG/DL
HBA1C MFR BLD HPLC: 6.8 %
HCT VFR BLD CALC: 46.4 %
HDLC SERPL-MCNC: 48 MG/DL
HGB BLD-MCNC: 14.2 G/DL
IMM GRANULOCYTES NFR BLD AUTO: 0.2 %
LDLC SERPL CALC-MCNC: 62 MG/DL
LYMPHOCYTES # BLD AUTO: 2.84 K/UL
LYMPHOCYTES NFR BLD AUTO: 34.6 %
MAN DIFF?: NORMAL
MCHC RBC-ENTMCNC: 28.2 PG
MCHC RBC-ENTMCNC: 30.6 GM/DL
MCV RBC AUTO: 92.2 FL
MONOCYTES # BLD AUTO: 0.46 K/UL
MONOCYTES NFR BLD AUTO: 5.6 %
NEUTROPHILS # BLD AUTO: 4.68 K/UL
NEUTROPHILS NFR BLD AUTO: 57.2 %
NONHDLC SERPL-MCNC: 86 MG/DL
PLATELET # BLD AUTO: 274 K/UL
POTASSIUM SERPL-SCNC: 4.7 MMOL/L
PROT SERPL-MCNC: 6.8 G/DL
RBC # BLD: 5.03 M/UL
RBC # FLD: 15.4 %
SODIUM SERPL-SCNC: 142 MMOL/L
TRIGL SERPL-MCNC: 119 MG/DL
TSH SERPL-ACNC: 2.34 UIU/ML
VIT B12 SERPL-MCNC: 1280 PG/ML
WBC # FLD AUTO: 8.2 K/UL

## 2023-04-19 NOTE — ADDENDUM
[FreeTextEntry1] : Recent laboratory results as below; discussed with Ms. Doris Spence as per her preference. HbA1c 6.8%. Lipid panel around goal. Serum calcium borderline elevated, which may be due to dehydration; recommend repeat testing with next blood tests. Vitamin B12 borderline elevated and can decrease supplementation. Other test results within range. 4/19/23

## 2023-04-19 NOTE — ASSESSMENT
[FreeTextEntry1] : Type 2 diabetes mellitus. HbA1c 6.9% in December 2022 and glucose 91 mg/dL today. No known history of micro- or macrovascular complications. I congratulated her on her excellent glycemic control. We discussed the importance of diet and exercise and lifestyle modification for glycemic control. We discussed pharmacologic options for glycemic control. She is tolerating her current regimen and we will continue pending HbA1c measurement.\par Check complete blood count, comprehensive metabolic panel, lipid panel, TSH, vitamin B12, 25-hydroxyvitamin D \par Continue metformin ER 1500 mg daily\par Continue sitagliptin 100 mg daily\par She is not on a blood pressure regimen; blood pressure around goal\par She is on statin for cholesterol; last lipid panel around goal \par Nephropathy screening: Urine microalbumin within range in December 2022\par Last ophthalmology appointment: Every six months\par Last podiatry appointment: Every six weeks\par Last dental appointment: Every six months\par \par Elevated alkaline phosphatase/bone health. Alkaline phosphatase had been elevated with normal gamma glutamyl transferase. Nuclear medicine bone scan in December 2018 without evidence of Paget's disease or other abnormal uptake. She and her sister may have a genetic etiology. She states her last bone density test was normal.\par \par Return to see me in 4-6 months.\par \par CC:\par Dr. Emely Mayer, Fax [phone]

## 2023-04-19 NOTE — HISTORY OF PRESENT ILLNESS
[FreeTextEntry1] : Ms. Spence is a 73 year-old woman with a history of type 2 diabetes mellitus, breast cancer in situ status post lumpectomy on letrozole presenting for follow-up. I saw her for an initial visit in August 2018 and last in December 2022; she is a former patient of Dr. Adrian Snyder. She is accompanied by her sister, Doris.\par \par Type 2 diabetes mellitus. HbA1c 6.9% in December 2022 and glucose 91 mg/dL today; HbA1c 6.6% in July 2022, 7.2% in April 2022, 6.6% in December 2021, 6.6% in June 2021, 6.1% in December 2020, 6.6% in June 2020, 6.9% in December 2019, 7.0% in June 2019, 6.6% in January 2019 and 7.5% in August 2018. No known history of micro- or macrovascular complications.\par She was diagnosed with diabetes around 2015.\par At her initial visit she was taking metformin ER 1500 mg daily. We continued metformin ER 1500 mg daily and started Januvia 100 mg daily in September 2018. 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 Her sister and her wife recently moved to [place] also.\par She has cut down on carbohydrates. She walks 4-6 miles a day. She goes to the gym five days a week. She has been following a daily list of lifestyle goals made by her primary care provider. \par She feels well today. Weight is down 1 pound since last visit and overall down 16 pounds from 165 pounds. No chest pain, shortness of breath, polyuria/polydipsia, lower extremity numbness/tingling.\par Medical and surgical history, medications, allergies, social and family history reviewed and updated as needed.

## 2023-04-19 NOTE — PHYSICAL EXAM
[Alert] : alert [Healthy Appearance] : healthy appearance [No Acute Distress] : no acute distress [Normal Sclera/Conjunctiva] : normal sclera/conjunctiva [Normal Hearing] : hearing was normal [No Respiratory Distress] : no respiratory distress [No Stigmata of Cushings Syndrome] : no stigmata of Cushings Syndrome [Normal Gait] : normal gait [Normal Insight/Judgement] : insight and judgment were intact [Kyphosis] : no kyphosis present [Acanthosis Nigricans] : no acanthosis nigricans [de-identified] : no moon facies, no supraclavicular fat pads

## 2023-04-26 ENCOUNTER — RX RENEWAL (OUTPATIENT)
Age: 73
End: 2023-04-26

## 2023-05-22 ENCOUNTER — APPOINTMENT (OUTPATIENT)
Age: 73
End: 2023-05-22
Payer: MEDICARE

## 2023-05-22 VITALS
HEIGHT: 67 IN | WEIGHT: 149 LBS | HEART RATE: 81 BPM | TEMPERATURE: 98.2 F | DIASTOLIC BLOOD PRESSURE: 78 MMHG | BODY MASS INDEX: 23.39 KG/M2 | SYSTOLIC BLOOD PRESSURE: 122 MMHG | OXYGEN SATURATION: 96 %

## 2023-05-22 PROCEDURE — 99213 OFFICE O/P EST LOW 20 MIN: CPT

## 2023-05-22 NOTE — CONSULT LETTER
[Dear  ___] : Dear  [unfilled], [Courtesy Letter:] : I had the pleasure of seeing your patient, [unfilled], in my office today. [Please see my note below.] : Please see my note below. [Consult Closing:] : Thank you very much for allowing me to participate in the care of this patient.  If you have any questions, please do not hesitate to contact me. [Sincerely,] : Sincerely, [FreeTextEntry3] : Julian Harden MD

## 2023-05-22 NOTE — HISTORY OF PRESENT ILLNESS
[FreeTextEntry1] : CATRACHITO BARLOW is a 73 year who returns to follow up for memory loss and b12 deficiency\par \par last visit was 7/28/2022 when I referred her for f/u neuropsych testing due to worsening of language\par \par since last visit had neuropsych testing at [facility]\par \par continues to enjoy life at assisted living.  very socially and physically active\par \par \par

## 2023-05-22 NOTE — PHYSICAL EXAM
[FreeTextEntry1] : General: this is a pleasant patient in no acute distress\par \par HEENT conjunctiva are normal, oropharynx is clear, no tenderness in head\par \par CV: normal pulses, regular rate and rhythm, no peripheral edema noted\par \par Lungs: breathing is non-labored\par \par abd: soft and non-distended\par \par MSK:\par SLR: \par BAO:\par range of motion:\par tinnels: \par spurling:\par Occipital nerve tenderness:\par \par Mental status:\par Alert and oriented to person, place and time\par Memory: registers 3/3, recalls 0/3\par Attention: serial 7s 100-93-65\par $5-$1.35 = 3.65\par Language is normal with intermittent hesitancy\par repeats complex sentence\par follows embedded commands with some difficulty\par \par \par Cranial Nerves:\par extra-occular movements in tact without nystagmus, normal saccades and smooth pursuit, visual fields full to confrontation, pupils equal, round and reactive to light. Face symmetric and facial strength symmetric, facial sensation symmetric, hearing present bilaterally to finger rub, neck flexion and extension normal strength.\par \par Motor: normal bulk and tone throughout. no abnormal movements.  Full 5/5 strength uppers and lower extremities proximally and distally\par \par Sensory: in tact and symmetric to vibration, light tough, pin prick, temperature decreased vib on R ankle\par \par Cerebellar: normal finger-nose-finger bilaterally\par \par Reflexes: 2+ in the upper and lower extremities and symmetric.  toes are bilaterally downgoing.\par \par Gait: stable, able to tip toe heel and tandem\par \par Stances:\par Romberg: stable\par \par

## 2023-06-01 ENCOUNTER — TRANSCRIPTION ENCOUNTER (OUTPATIENT)
Age: 73
End: 2023-06-01

## 2023-10-24 ENCOUNTER — APPOINTMENT (OUTPATIENT)
Dept: ENDOCRINOLOGY | Facility: CLINIC | Age: 73
End: 2023-10-24
Payer: MEDICARE

## 2023-10-24 VITALS
DIASTOLIC BLOOD PRESSURE: 77 MMHG | SYSTOLIC BLOOD PRESSURE: 126 MMHG | HEART RATE: 74 BPM | WEIGHT: 141 LBS | BODY MASS INDEX: 22.08 KG/M2

## 2023-10-24 DIAGNOSIS — R74.8 ABNORMAL LEVELS OF OTHER SERUM ENZYMES: ICD-10-CM

## 2023-10-24 DIAGNOSIS — E11.69 TYPE 2 DIABETES MELLITUS WITH OTHER SPECIFIED COMPLICATION: ICD-10-CM

## 2023-10-24 DIAGNOSIS — E78.5 TYPE 2 DIABETES MELLITUS WITH OTHER SPECIFIED COMPLICATION: ICD-10-CM

## 2023-10-24 DIAGNOSIS — E11.9 TYPE 2 DIABETES MELLITUS W/OUT COMPLICATIONS: ICD-10-CM

## 2023-10-24 LAB
GLUCOSE BLDC GLUCOMTR-MCNC: 85
HBA1C MFR BLD HPLC: 6.5

## 2023-10-24 PROCEDURE — 83036 HEMOGLOBIN GLYCOSYLATED A1C: CPT | Mod: QW

## 2023-10-24 PROCEDURE — 99214 OFFICE O/P EST MOD 30 MIN: CPT | Mod: 25

## 2023-10-24 PROCEDURE — 82962 GLUCOSE BLOOD TEST: CPT

## 2023-10-24 RX ORDER — SITAGLIPTIN 100 MG/1
100 TABLET, FILM COATED ORAL
Qty: 90 | Refills: 3 | Status: ACTIVE | COMMUNITY
Start: 2018-09-05 | End: 1900-01-01

## 2023-10-24 RX ORDER — METFORMIN ER 750 MG 750 MG/1
750 TABLET ORAL
Qty: 180 | Refills: 3 | Status: ACTIVE | COMMUNITY
Start: 2022-06-06 | End: 1900-01-01

## 2023-10-24 RX ORDER — VITAMIN E ACETATE 670 MG
CAPSULE ORAL
Refills: 0 | Status: ACTIVE | COMMUNITY

## 2024-04-08 ENCOUNTER — NON-APPOINTMENT (OUTPATIENT)
Age: 74
End: 2024-04-08

## 2024-04-08 ENCOUNTER — APPOINTMENT (OUTPATIENT)
Dept: NEUROLOGY | Facility: CLINIC | Age: 74
End: 2024-04-08
Payer: MEDICARE

## 2024-04-08 VITALS
WEIGHT: 151 LBS | RESPIRATION RATE: 17 BRPM | SYSTOLIC BLOOD PRESSURE: 123 MMHG | BODY MASS INDEX: 23.65 KG/M2 | HEART RATE: 86 BPM | OXYGEN SATURATION: 97 % | TEMPERATURE: 97.7 F | DIASTOLIC BLOOD PRESSURE: 75 MMHG

## 2024-04-08 DIAGNOSIS — E53.8 DEFICIENCY OF OTHER SPECIFIED B GROUP VITAMINS: ICD-10-CM

## 2024-04-08 DIAGNOSIS — R40.4 TRANSIENT ALTERATION OF AWARENESS: ICD-10-CM

## 2024-04-08 DIAGNOSIS — R41.3 OTHER AMNESIA: ICD-10-CM

## 2024-04-08 PROCEDURE — 99215 OFFICE O/P EST HI 40 MIN: CPT

## 2024-04-08 NOTE — HISTORY OF PRESENT ILLNESS
[FreeTextEntry1] : CATRACHITO BARLOW is a 74 year who returns to follow up for memory loss and b12 deficiency.  Her with sister Doris who I also call with results at the patients request  last visit was 5/22/2023 when she was stable  since last visit continues to be active, swim classes, lectures,   doesn't remember some major aspects of her life.  needs more help in general. sister reports she has forgotten their parents at times.   2 concerning incidents  1) in Oct was having a conversation and was suddenly staring into space and kept swallowing. in a couple of minutes became aware again and normal but didn't remember the event  2) in Feb responded to a text with unintelligible response.  eventually was found on couch and didn't remember the day before or what was going on  denies waking up with TB or incontinence.

## 2024-04-08 NOTE — PHYSICAL EXAM
[FreeTextEntry1] : General: this is a pleasant patient in no acute distress    HEENT conjunctiva are normal, oropharynx is clear, no tenderness in head    CV: normal pulses, regular rate and rhythm, no peripheral edema noted    Lungs: breathing is non-labored    abd: soft and non-distended    MSK:  SLR:  BAO:  range of motion:  tinnels:  spurling:  Occipital nerve tenderness:    Mental status:  Alert and oriented to person, place and time  Memory: registers 3/3, recalls 0/3  Attention: serial 7s [id number]  Language is normal with intermittent hesitancy  repeats complex sentence  follows embedded commands with some difficulty      Cranial Nerves:  extra-occular movements in tact without nystagmus, normal saccades and smooth pursuit, visual fields full to confrontation, pupils equal, round and reactive to light. Face symmetric and facial strength symmetric, facial sensation symmetric, hearing present bilaterally to finger rub, neck flexion and extension normal strength.    Motor: normal bulk and tone throughout. no abnormal movements. Full 5/5 strength uppers and lower extremities proximally and distally    Sensory: in tact and symmetric to vibration, light tough, pin prick, temperature decreased vib on R ankle    Cerebellar: normal finger-nose-finger bilaterally    Reflexes: 2+ in the upper and lower extremities and symmetric. toes are bilaterally downgoing.    Gait: stable, able to tip toe heel and tandem    Stances:  Romberg: stable

## 2024-04-09 LAB
HCYS SERPL-MCNC: 8.1 UMOL/L
VIT B12 SERPL-MCNC: 1155 PG/ML

## 2024-04-16 LAB
AMPA-R ABCBA: NEGATIVE
AMPHIPHYSIN IGG TITR SER IF: NEGATIVE
ANNOTATION COMMENT IMP: NORMAL
CASPR2-IGG CBA, S: NEGATIVE
CV2 IGG TITR SER: NEGATIVE
GABA-B ABCBA: NEGATIVE
GAD65 AB SER-MCNC: 0 NMOL/L
GLIAL NUC TYPE 1 AB TITR SER: NEGATIVE
HU1 AB TITR SER: NEGATIVE
HU2 AB TITR SER IF: NEGATIVE
HU3 AB TITR SER: NEGATIVE
IGLON5 IFA, S: NEGATIVE
IMMUNOLOGIST REVIEW: NORMAL
LGI1-IGG CBA, S: NEGATIVE
METHYLMALONATE SERPL-SCNC: 148 NMOL/L
NIF IFA, S: NEGATIVE
NMDA-R ABCBA: NEGATIVE
PCA-1 AB TITR SER: NEGATIVE
PCA-2 AB TITR SER: NEGATIVE
PCA-TR AB TITR SER: NEGATIVE

## 2024-05-24 ENCOUNTER — TRANSCRIPTION ENCOUNTER (OUTPATIENT)
Age: 74
End: 2024-05-24

## 2024-05-30 ENCOUNTER — TRANSCRIPTION ENCOUNTER (OUTPATIENT)
Age: 74
End: 2024-05-30

## 2024-05-30 ENCOUNTER — NON-APPOINTMENT (OUTPATIENT)
Age: 74
End: 2024-05-30

## 2024-06-03 ENCOUNTER — APPOINTMENT (OUTPATIENT)
Dept: ENDOCRINOLOGY | Facility: CLINIC | Age: 74
End: 2024-06-03

## 2024-06-13 DIAGNOSIS — G40.909 EPILEPSY, UNSPECIFIED, NOT INTRACTABLE, W/OUT STATUS EPILEPTICUS: ICD-10-CM

## 2024-06-13 RX ORDER — LEVETIRACETAM 500 MG/1
500 TABLET, FILM COATED ORAL TWICE DAILY
Qty: 180 | Refills: 1 | Status: ACTIVE | COMMUNITY
Start: 2024-06-13 | End: 1900-01-01

## 2024-06-17 ENCOUNTER — NON-APPOINTMENT (OUTPATIENT)
Age: 74
End: 2024-06-17

## 2024-06-28 ENCOUNTER — NON-APPOINTMENT (OUTPATIENT)
Age: 74
End: 2024-06-28

## 2024-08-15 ENCOUNTER — NON-APPOINTMENT (OUTPATIENT)
Age: 74
End: 2024-08-15

## 2024-08-16 ENCOUNTER — APPOINTMENT (OUTPATIENT)
Dept: NEUROLOGY | Age: 74
End: 2024-08-16
Payer: MEDICARE

## 2024-08-16 VITALS
SYSTOLIC BLOOD PRESSURE: 99 MMHG | RESPIRATION RATE: 17 BRPM | OXYGEN SATURATION: 97 % | WEIGHT: 151 LBS | BODY MASS INDEX: 23.65 KG/M2 | TEMPERATURE: 98.1 F | HEART RATE: 88 BPM | DIASTOLIC BLOOD PRESSURE: 63 MMHG

## 2024-08-16 DIAGNOSIS — E53.8 DEFICIENCY OF OTHER SPECIFIED B GROUP VITAMINS: ICD-10-CM

## 2024-08-16 DIAGNOSIS — R41.3 OTHER AMNESIA: ICD-10-CM

## 2024-08-16 DIAGNOSIS — G40.909 EPILEPSY, UNSPECIFIED, NOT INTRACTABLE, W/OUT STATUS EPILEPTICUS: ICD-10-CM

## 2024-08-16 PROCEDURE — 99215 OFFICE O/P EST HI 40 MIN: CPT

## 2024-08-16 NOTE — DATA REVIEWED
[de-identified] : volume loss [de-identified] : amb EEG R temporal LRDA and slowing. occasional left anterior sharp waves [de-identified] : brain: right temporal hypometabolism [de-identified] : encephalitis panel negative, b12, MMA, homocystine normal

## 2024-08-16 NOTE — HISTORY OF PRESENT ILLNESS
[FreeTextEntry1] : CATRACHITO BARLOW is a 74 year who returns to follow up for memory loss and b12 deficiency. Her with sister Doris who I also call with results at the patients request  last visit was 4/58/2024  since last visit we have had a few phone calls and messages and tests were done. I called in keppra 500mg BID 6/17 and then suggested increase to 750mg BID on 6/29 and then 1000mg BID over the phone 7/5. at 750mg BID might have been more energetic. I asked to have repeat amb EEG on 1000mg BID  they feel she is more engaged and involved on this medication but memory has not improved at all.  she has been on 1000mg BID for a week or so  the episodes she was having have stopped  having repeat amb EEG on 9/30.

## 2024-08-16 NOTE — CONSULT LETTER
[Dear  ___] : Dear  [unfilled], [Courtesy Letter:] : I had the pleasure of seeing your patient, [unfilled], in my office today. [Please see my note below.] : Please see my note below. [Consult Closing:] : Thank you very much for allowing me to participate in the care of this patient.  If you have any questions, please do not hesitate to contact me. [Sincerely,] : Sincerely, [FreeTextEntry3] : Julian Harden MD None

## 2024-08-16 NOTE — PHYSICAL EXAM
[FreeTextEntry1] : General: this is a pleasant patient in no acute distress    HEENT conjunctiva are normal, oropharynx is clear, no tenderness in head    CV: normal pulses, regular rate and rhythm, no peripheral edema noted    Lungs: breathing is non-labored    abd: soft and non-distended    MSK:  SLR:  BAO:  range of motion:  tinnels:  spurling:  Occipital nerve tenderness:    Mental status:  Alert and oriented to person, place and time  Memory: registers 3/3, recalls 0/3  Attention: serial 7s 100-93-75  Language is normal with intermittent hesitancy  repeats complex sentence  follows embedded commands with some difficulty      Cranial Nerves:  extra-occular movements in tact without nystagmus, normal saccades and smooth pursuit, visual fields full to confrontation, pupils equal, round and reactive to light. Face symmetric and facial strength symmetric, facial sensation symmetric, hearing present bilaterally to finger rub, neck flexion and extension normal strength.    Motor: normal bulk and tone throughout. no abnormal movements. Full 5/5 strength uppers and lower extremities proximally and distally    Sensory: in tact and symmetric to vibration, light tough, pin prick, temperature decreased vib on R ankle    Cerebellar: normal finger-nose-finger bilaterally    Reflexes: 2+ in the upper and lower extremities and symmetric. toes are bilaterally downgoing.    Gait: stable, able to tip toe heel and tandem    Stances:  Romberg: stable.

## 2024-08-19 LAB
ALBUMIN SERPL ELPH-MCNC: 4.1 G/DL
ALP BLD-CCNC: 140 U/L
ALT SERPL-CCNC: 16 U/L
ANION GAP SERPL CALC-SCNC: 15 MMOL/L
AST SERPL-CCNC: 18 U/L
BILIRUB SERPL-MCNC: 0.4 MG/DL
BUN SERPL-MCNC: 20 MG/DL
CALCIUM SERPL-MCNC: 9.4 MG/DL
CHLORIDE SERPL-SCNC: 105 MMOL/L
CO2 SERPL-SCNC: 21 MMOL/L
CREAT SERPL-MCNC: 0.96 MG/DL
EGFR: 62 ML/MIN/1.73M2
GLUCOSE SERPL-MCNC: 168 MG/DL
HCT VFR BLD CALC: 42.6 %
HGB BLD-MCNC: 13.3 G/DL
M PROTEIN SPEC IFE-MCNC: NORMAL
MCHC RBC-ENTMCNC: 29 PG
MCHC RBC-ENTMCNC: 31.2 GM/DL
MCV RBC AUTO: 92.8 FL
PLATELET # BLD AUTO: 253 K/UL
POTASSIUM SERPL-SCNC: 4.2 MMOL/L
PROT SERPL-MCNC: 6.2 G/DL
RBC # BLD: 4.59 M/UL
RBC # FLD: 15 %
SODIUM SERPL-SCNC: 141 MMOL/L
WBC # FLD AUTO: 7.29 K/UL

## 2024-08-29 ENCOUNTER — NON-APPOINTMENT (OUTPATIENT)
Age: 74
End: 2024-08-29

## 2024-09-26 ENCOUNTER — RX RENEWAL (OUTPATIENT)
Age: 74
End: 2024-09-26

## 2024-10-03 ENCOUNTER — TRANSCRIPTION ENCOUNTER (OUTPATIENT)
Age: 74
End: 2024-10-03

## 2024-12-12 ENCOUNTER — APPOINTMENT (OUTPATIENT)
Dept: NEUROLOGY | Facility: CLINIC | Age: 74
End: 2024-12-12

## 2024-12-12 PROCEDURE — 96139 PSYCL/NRPSYC TST TECH EA: CPT

## 2024-12-12 PROCEDURE — 96132 NRPSYC TST EVAL PHYS/QHP 1ST: CPT

## 2024-12-12 PROCEDURE — 96133 NRPSYC TST EVAL PHYS/QHP EA: CPT

## 2024-12-12 PROCEDURE — 96138 PSYCL/NRPSYC TECH 1ST: CPT

## 2024-12-12 PROCEDURE — 96116 NUBHVL XM PHYS/QHP 1ST HR: CPT
